# Patient Record
Sex: FEMALE | Race: WHITE | HISPANIC OR LATINO | URBAN - METROPOLITAN AREA
[De-identification: names, ages, dates, MRNs, and addresses within clinical notes are randomized per-mention and may not be internally consistent; named-entity substitution may affect disease eponyms.]

---

## 2018-11-07 PROBLEM — Z00.00 ENCOUNTER FOR PREVENTIVE HEALTH EXAMINATION: Status: ACTIVE | Noted: 2018-11-07

## 2018-11-14 ENCOUNTER — OUTPATIENT (OUTPATIENT)
Dept: OUTPATIENT SERVICES | Facility: HOSPITAL | Age: 48
LOS: 1 days | Discharge: HOME | End: 2018-11-14

## 2018-11-14 ENCOUNTER — APPOINTMENT (OUTPATIENT)
Dept: HEMATOLOGY ONCOLOGY | Facility: CLINIC | Age: 48
End: 2018-11-14

## 2018-11-14 VITALS
HEART RATE: 135 BPM | DIASTOLIC BLOOD PRESSURE: 106 MMHG | SYSTOLIC BLOOD PRESSURE: 145 MMHG | BODY MASS INDEX: 28.32 KG/M2 | WEIGHT: 150 LBS | HEIGHT: 61 IN | TEMPERATURE: 95.4 F

## 2018-11-14 RX ORDER — FENTANYL 25 UG/H
25 PATCH, EXTENDED RELEASE TRANSDERMAL
Qty: 5 | Refills: 0 | Status: ACTIVE | COMMUNITY
Start: 2018-09-21

## 2018-11-14 RX ORDER — PROCHLORPERAZINE MALEATE 10 MG/1
10 TABLET ORAL
Qty: 120 | Refills: 0 | Status: ACTIVE | COMMUNITY
Start: 2018-07-31

## 2018-11-14 RX ORDER — OSTOMY SUPPLY 2 3/4"
EACH MISCELLANEOUS
Qty: 20 | Refills: 0 | Status: DISCONTINUED | COMMUNITY
Start: 2018-07-05

## 2018-11-14 RX ORDER — OSTOMY SUPPLY
POUCH EACH MISCELLANEOUS
Qty: 20 | Refills: 0 | Status: DISCONTINUED | COMMUNITY
Start: 2018-07-06

## 2018-11-14 RX ORDER — ALLOPURINOL 100 MG/1
100 TABLET ORAL
Qty: 30 | Refills: 0 | Status: DISCONTINUED | COMMUNITY
Start: 2018-07-31

## 2018-11-14 RX ORDER — APIXABAN 2.5 MG/1
2.5 TABLET, FILM COATED ORAL
Qty: 60 | Refills: 0 | Status: DISCONTINUED | COMMUNITY
Start: 2018-07-31

## 2018-11-14 RX ORDER — ONDANSETRON 4 MG/1
4 TABLET, ORALLY DISINTEGRATING ORAL
Qty: 14 | Refills: 0 | Status: ACTIVE | COMMUNITY
Start: 2018-07-31

## 2018-11-14 RX ORDER — OSTOMY ADHESIVE
STRIP MISCELLANEOUS
Qty: 10 | Refills: 0 | Status: DISCONTINUED | COMMUNITY
Start: 2018-07-05

## 2018-11-14 RX ORDER — OSTOMY SUPPLY
PASTE (GRAM) MISCELLANEOUS
Qty: 60 | Refills: 0 | Status: DISCONTINUED | COMMUNITY
Start: 2018-07-06

## 2018-11-14 RX ORDER — ONDANSETRON 8 MG/1
8 TABLET ORAL
Qty: 30 | Refills: 0 | Status: ACTIVE | COMMUNITY
Start: 2018-08-22

## 2018-11-14 RX ORDER — OXYCODONE AND ACETAMINOPHEN 5; 325 MG/1; MG/1
5-325 TABLET ORAL
Qty: 90 | Refills: 0 | Status: ACTIVE | COMMUNITY
Start: 2018-11-12

## 2018-11-14 RX ORDER — ACETAMINOPHEN 500 MG/1
500 TABLET ORAL
Qty: 90 | Refills: 0 | Status: ACTIVE | COMMUNITY
Start: 2018-08-29

## 2018-11-14 RX ORDER — DRONABINOL 2.5 MG/1
2.5 CAPSULE ORAL
Qty: 10 | Refills: 0 | Status: ACTIVE | COMMUNITY
Start: 2018-09-21

## 2018-11-14 NOTE — ASSESSMENT
[FreeTextEntry1] : 49 yo woman with ECOG 2-3 diagnosed with endometrial carcinoma stage IV s/p resection in June 2018 and s/p chemotherapy August-October 2018, weekly (however, regimen is not entrirely clear); as per imaging at [facility], her disease progressed and she is symptomatic and getting weaker. Her primary oncologist offered her immunotherapy and she is here for second opinion.\par \par Recommend:\par - need to clarify the initial regimen\par - if disease progressed , agree with changing the regimen pending pt's perfromance status is appropriate enough to tolerate and benefit from chemo\par - covered the idea of hospice, but pt/family adomantly refused\par - they will see [facility] oncology on 11.16.18 and will decide where they follow

## 2018-11-14 NOTE — REVIEW OF SYSTEMS
[Fever] : no fever [Chills] : no chills [Night Sweats] : no night sweats [Fatigue] : fatigue [Recent Change In Weight] : ~T recent weight change [Chest Pain] : no chest pain [Shortness Of Breath] : no shortness of breath [Abdominal Pain] : abdominal pain [Vomiting] : vomiting [Constipation] : no constipation [Negative] : Allergic/Immunologic

## 2018-11-14 NOTE — HISTORY OF PRESENT ILLNESS
[Disease: _____________________] : Disease: [unfilled] [de-identified] : 47 yo woman with ECOG 2-3 with endometrial carcinoma [id number] s/p JONAS/BSO/omentectomy in June 2018. As per family, pt received weekly chemotherapy from August to October 2018; unclear the drugs/dosage. As per the pt, the chemo was stopped in October. Restaging imaging ct scan in Novermber 2018 showed progresssion of disease and she was offered immunotherapy.\par \par pt c/o pain and nausea and weakness and worsened appetite. \par PMH; HTN\par  [de-identified] : IVB

## 2018-11-14 NOTE — PHYSICAL EXAM
[Capable of only limited self care, confined to bed or chair more than 50% of waking hours] : Status 3- Capable of only limited self care, confined to bed or chair more than 50% of waking hours [Cachectic] : cachectic [Normal] : affect appropriate

## 2018-11-28 DIAGNOSIS — C54.1 MALIGNANT NEOPLASM OF ENDOMETRIUM: ICD-10-CM
